# Patient Record
Sex: MALE | Race: OTHER | HISPANIC OR LATINO | Employment: UNEMPLOYED | URBAN - METROPOLITAN AREA
[De-identification: names, ages, dates, MRNs, and addresses within clinical notes are randomized per-mention and may not be internally consistent; named-entity substitution may affect disease eponyms.]

---

## 2023-07-07 ENCOUNTER — APPOINTMENT (EMERGENCY)
Dept: CT IMAGING | Facility: HOSPITAL | Age: 48
End: 2023-07-07

## 2023-07-07 ENCOUNTER — HOSPITAL ENCOUNTER (EMERGENCY)
Facility: HOSPITAL | Age: 48
Discharge: HOME | End: 2023-07-07
Attending: EMERGENCY MEDICINE

## 2023-07-07 VITALS
SYSTOLIC BLOOD PRESSURE: 130 MMHG | RESPIRATION RATE: 16 BRPM | TEMPERATURE: 98.8 F | OXYGEN SATURATION: 97 % | HEART RATE: 74 BPM | DIASTOLIC BLOOD PRESSURE: 81 MMHG

## 2023-07-07 DIAGNOSIS — Z76.0 MEDICATION REFILL: ICD-10-CM

## 2023-07-07 DIAGNOSIS — N20.1 RIGHT URETERAL STONE: Primary | ICD-10-CM

## 2023-07-07 LAB
ALBUMIN SERPL BCP-MCNC: 4.4 G/DL (ref 3.5–5)
ALP SERPL-CCNC: 76 U/L (ref 34–104)
ALT SERPL W P-5'-P-CCNC: 25 U/L (ref 7–52)
ANION GAP SERPL CALCULATED.3IONS-SCNC: 8 MMOL/L
AST SERPL W P-5'-P-CCNC: 17 U/L (ref 13–39)
BACTERIA UR QL AUTO: ABNORMAL /HPF
BASOPHILS # BLD AUTO: 0.02 THOUSANDS/ÂΜL (ref 0–0.1)
BASOPHILS NFR BLD AUTO: 0 % (ref 0–1)
BILIRUB SERPL-MCNC: 0.37 MG/DL (ref 0.2–1)
BILIRUB UR QL STRIP: NEGATIVE
BUN SERPL-MCNC: 18 MG/DL (ref 5–25)
CALCIUM SERPL-MCNC: 9.5 MG/DL (ref 8.4–10.2)
CHLORIDE SERPL-SCNC: 103 MMOL/L (ref 96–108)
CLARITY UR: CLEAR
CO2 SERPL-SCNC: 25 MMOL/L (ref 21–32)
COLOR UR: YELLOW
CREAT SERPL-MCNC: 1.17 MG/DL (ref 0.6–1.3)
EOSINOPHIL # BLD AUTO: 0.07 THOUSAND/ÂΜL (ref 0–0.61)
EOSINOPHIL NFR BLD AUTO: 1 % (ref 0–6)
ERYTHROCYTE [DISTWIDTH] IN BLOOD BY AUTOMATED COUNT: 13.2 % (ref 11.6–15.1)
GFR SERPL CREATININE-BSD FRML MDRD: 73 ML/MIN/1.73SQ M
GLUCOSE SERPL-MCNC: 128 MG/DL (ref 65–140)
GLUCOSE UR STRIP-MCNC: NEGATIVE MG/DL
HCT VFR BLD AUTO: 41.9 % (ref 36.5–49.3)
HGB BLD-MCNC: 14.3 G/DL (ref 12–17)
HGB UR QL STRIP.AUTO: ABNORMAL
IMM GRANULOCYTES # BLD AUTO: 0.03 THOUSAND/UL (ref 0–0.2)
IMM GRANULOCYTES NFR BLD AUTO: 0 % (ref 0–2)
KETONES UR STRIP-MCNC: NEGATIVE MG/DL
LEUKOCYTE ESTERASE UR QL STRIP: NEGATIVE
LIPASE SERPL-CCNC: 20 U/L (ref 11–82)
LYMPHOCYTES # BLD AUTO: 1.79 THOUSANDS/ÂΜL (ref 0.6–4.47)
LYMPHOCYTES NFR BLD AUTO: 24 % (ref 14–44)
MAGNESIUM SERPL-MCNC: 2 MG/DL (ref 1.9–2.7)
MCH RBC QN AUTO: 29.7 PG (ref 26.8–34.3)
MCHC RBC AUTO-ENTMCNC: 34.1 G/DL (ref 31.4–37.4)
MCV RBC AUTO: 87 FL (ref 82–98)
MONOCYTES # BLD AUTO: 0.49 THOUSAND/ÂΜL (ref 0.17–1.22)
MONOCYTES NFR BLD AUTO: 6 % (ref 4–12)
NEUTROPHILS # BLD AUTO: 5.2 THOUSANDS/ÂΜL (ref 1.85–7.62)
NEUTS SEG NFR BLD AUTO: 69 % (ref 43–75)
NITRITE UR QL STRIP: NEGATIVE
NON-SQ EPI CELLS URNS QL MICRO: ABNORMAL /HPF
NRBC BLD AUTO-RTO: 0 /100 WBCS
PH UR STRIP.AUTO: 7.5 [PH]
PLATELET # BLD AUTO: 248 THOUSANDS/UL (ref 149–390)
PMV BLD AUTO: 8.3 FL (ref 8.9–12.7)
POTASSIUM SERPL-SCNC: 3.8 MMOL/L (ref 3.5–5.3)
PROT SERPL-MCNC: 7.5 G/DL (ref 6.4–8.4)
PROT UR STRIP-MCNC: NEGATIVE MG/DL
RBC # BLD AUTO: 4.81 MILLION/UL (ref 3.88–5.62)
RBC #/AREA URNS AUTO: ABNORMAL /HPF
SODIUM SERPL-SCNC: 136 MMOL/L (ref 135–147)
SP GR UR STRIP.AUTO: 1.02 (ref 1–1.03)
UROBILINOGEN UR QL STRIP.AUTO: 0.2 E.U./DL
WBC # BLD AUTO: 7.6 THOUSAND/UL (ref 4.31–10.16)
WBC #/AREA URNS AUTO: ABNORMAL /HPF

## 2023-07-07 PROCEDURE — 36415 COLL VENOUS BLD VENIPUNCTURE: CPT | Performed by: EMERGENCY MEDICINE

## 2023-07-07 PROCEDURE — 85025 COMPLETE CBC W/AUTO DIFF WBC: CPT | Performed by: EMERGENCY MEDICINE

## 2023-07-07 PROCEDURE — 80053 COMPREHEN METABOLIC PANEL: CPT | Performed by: EMERGENCY MEDICINE

## 2023-07-07 PROCEDURE — 83735 ASSAY OF MAGNESIUM: CPT | Performed by: EMERGENCY MEDICINE

## 2023-07-07 PROCEDURE — 74176 CT ABD & PELVIS W/O CONTRAST: CPT

## 2023-07-07 PROCEDURE — 83690 ASSAY OF LIPASE: CPT | Performed by: EMERGENCY MEDICINE

## 2023-07-07 PROCEDURE — 81001 URINALYSIS AUTO W/SCOPE: CPT | Performed by: EMERGENCY MEDICINE

## 2023-07-07 RX ORDER — KETOROLAC TROMETHAMINE 30 MG/ML
15 INJECTION, SOLUTION INTRAMUSCULAR; INTRAVENOUS ONCE
Status: COMPLETED | OUTPATIENT
Start: 2023-07-07 | End: 2023-07-07

## 2023-07-07 RX ORDER — TAMSULOSIN HYDROCHLORIDE 0.4 MG/1
0.4 CAPSULE ORAL DAILY
Qty: 5 CAPSULE | Refills: 0 | Status: SHIPPED | OUTPATIENT
Start: 2023-07-08 | End: 2023-07-13

## 2023-07-07 RX ORDER — TAMSULOSIN HYDROCHLORIDE 0.4 MG/1
0.4 CAPSULE ORAL ONCE
Status: COMPLETED | OUTPATIENT
Start: 2023-07-07 | End: 2023-07-07

## 2023-07-07 RX ORDER — OXYCODONE HYDROCHLORIDE 5 MG/1
5 TABLET ORAL EVERY 6 HOURS PRN
Qty: 10 TABLET | Refills: 0 | Status: SHIPPED | OUTPATIENT
Start: 2023-07-07 | End: 2023-07-10

## 2023-07-07 RX ORDER — MONTELUKAST SODIUM 10 MG/1
10 TABLET ORAL
COMMUNITY

## 2023-07-07 RX ORDER — MONTELUKAST SODIUM 10 MG/1
10 TABLET ORAL EVERY EVENING
Qty: 14 TABLET | Refills: 0 | Status: SHIPPED | OUTPATIENT
Start: 2023-07-07 | End: 2023-07-21

## 2023-07-07 RX ORDER — ONDANSETRON 2 MG/ML
4 INJECTION INTRAMUSCULAR; INTRAVENOUS ONCE
Status: COMPLETED | OUTPATIENT
Start: 2023-07-07 | End: 2023-07-07

## 2023-07-07 RX ORDER — HYDROMORPHONE HCL/PF 1 MG/ML
1 SYRINGE (ML) INJECTION ONCE
Status: COMPLETED | OUTPATIENT
Start: 2023-07-07 | End: 2023-07-07

## 2023-07-07 RX ADMIN — ONDANSETRON 4 MG: 2 INJECTION INTRAMUSCULAR; INTRAVENOUS at 04:43

## 2023-07-07 RX ADMIN — HYDROMORPHONE HYDROCHLORIDE 1 MG: 1 INJECTION, SOLUTION INTRAMUSCULAR; INTRAVENOUS; SUBCUTANEOUS at 04:43

## 2023-07-07 RX ADMIN — TAMSULOSIN HYDROCHLORIDE 0.4 MG: 0.4 CAPSULE ORAL at 06:18

## 2023-07-07 RX ADMIN — SODIUM CHLORIDE 1000 ML: 0.9 INJECTION, SOLUTION INTRAVENOUS at 04:43

## 2023-07-07 RX ADMIN — KETOROLAC TROMETHAMINE 15 MG: 30 INJECTION, SOLUTION INTRAMUSCULAR at 04:43

## 2023-07-07 NOTE — ED PROVIDER NOTES
History  Chief Complaint   Patient presents with   • Back Pain     Pt reports right sided flank pain that radiates to right middle quadrant in abdomen, urgency to urinate. 1,000 mg tylenol around 1200 this morning. Patient is a 25-year-old male seen in the emergency department with concern for strong right-sided flank pain which began this evening at approximately 12 AM.  Patient notes radiation of pain to the right lower quadrant. Patient notes some associated nausea and vomiting. Patient notes no fever, chest pain, shortness of breath, or diarrhea. Patient notes history of cholecystectomy in the past.  Patient took multiple over-the-counter medications at home, without significant improvement of symptoms noted. Patient notes no other definite clear exacerbating or alleviating factors for his symptoms. Patient denies having similar pain in the past.          Prior to Admission Medications   Prescriptions Last Dose Informant Patient Reported? Taking?   montelukast (SINGULAIR) 10 mg tablet Past Month  Yes Yes   Sig: Take 10 mg by mouth daily at bedtime      Facility-Administered Medications: None       History reviewed. No pertinent past medical history. Past Surgical History:   Procedure Laterality Date   • CHOLECYSTECTOMY OPEN         History reviewed. No pertinent family history. I have reviewed and agree with the history as documented. E-Cigarette/Vaping   • E-Cigarette Use Never User      E-Cigarette/Vaping Substances     Social History     Tobacco Use   • Smoking status: Never   • Smokeless tobacco: Never   Vaping Use   • Vaping Use: Never used   Substance Use Topics   • Alcohol use: Yes     Comment: socially   • Drug use: Never       Review of Systems   Constitutional: Negative for chills and fever. HENT: Negative for ear pain and sore throat. Eyes: Negative for pain and visual disturbance. Respiratory: Negative for cough and shortness of breath.     Cardiovascular: Negative for chest pain and palpitations. Gastrointestinal: Positive for abdominal pain, nausea and vomiting. Genitourinary: Positive for flank pain. Negative for decreased urine volume. Musculoskeletal: Negative for gait problem and neck stiffness. Skin: Negative for color change and rash. Neurological: Negative for seizures and syncope. Psychiatric/Behavioral: Negative for agitation and confusion. All other systems reviewed and are negative. Physical Exam  Physical Exam  Vitals and nursing note reviewed. Constitutional:       Appearance: He is well-developed. Comments: appears uncomfortable   HENT:      Head: Normocephalic and atraumatic. Right Ear: External ear normal.      Left Ear: External ear normal.      Nose: Nose normal.      Mouth/Throat:      Pharynx: Oropharynx is clear. Eyes:      General: No scleral icterus. Conjunctiva/sclera: Conjunctivae normal.   Cardiovascular:      Rate and Rhythm: Normal rate and regular rhythm. Heart sounds: No murmur heard. Pulmonary:      Effort: Pulmonary effort is normal. No respiratory distress. Breath sounds: Normal breath sounds. Abdominal:      Palpations: Abdomen is soft. Tenderness: There is no abdominal tenderness. There is right CVA tenderness. There is no left CVA tenderness. Musculoskeletal:         General: No deformity or signs of injury. Cervical back: Normal range of motion and neck supple. Skin:     General: Skin is warm and dry. Neurological:      General: No focal deficit present. Mental Status: He is alert. Cranial Nerves: No cranial nerve deficit. Sensory: No sensory deficit. Psychiatric:         Mood and Affect: Mood normal.         Thought Content:  Thought content normal.         Vital Signs  ED Triage Vitals   Temperature Pulse Respirations Blood Pressure SpO2   07/07/23 0401 07/07/23 0401 07/07/23 0401 07/07/23 0401 07/07/23 0401   98.8 °F (37.1 °C) 75 18 (!) 160/103 96 %      Temp Source Heart Rate Source Patient Position - Orthostatic VS BP Location FiO2 (%)   07/07/23 0401 07/07/23 0401 07/07/23 0401 07/07/23 0401 --   Oral Monitor Sitting Left arm       Pain Score       07/07/23 0443       10 - Worst Possible Pain           Vitals:    07/07/23 0401 07/07/23 0521   BP: (!) 160/103 130/81   Pulse: 75 74   Patient Position - Orthostatic VS: Sitting Lying         Visual Acuity      ED Medications  Medications   tamsulosin (FLOMAX) capsule 0.4 mg (has no administration in time range)   HYDROmorphone (DILAUDID) injection 1 mg (1 mg Intravenous Given 7/7/23 0443)   ondansetron (ZOFRAN) injection 4 mg (4 mg Intravenous Given 7/7/23 0443)   ketorolac (TORADOL) injection 15 mg (15 mg Intravenous Given 7/7/23 0443)   sodium chloride 0.9 % bolus 1,000 mL (1,000 mL Intravenous New Bag 7/7/23 0443)       Diagnostic Studies  Results Reviewed     Procedure Component Value Units Date/Time    Urine Microscopic [[id number]]  (Abnormal) Collected: 07/07/23 0420    Lab Status: Final result Specimen: Urine, Clean Catch Updated: 07/07/23 0539     RBC, UA 4-10 /hpf      WBC, UA 0-5 /hpf      Epithelial Cells Occasional /hpf      Bacteria, UA Occasional /hpf     Comprehensive metabolic panel [[id number]] Collected: 07/07/23 0420    Lab Status: Final result Specimen: Blood from Arm, Right Updated: 07/07/23 0448     Sodium 136 mmol/L      Potassium 3.8 mmol/L      Chloride 103 mmol/L      CO2 25 mmol/L      ANION GAP 8 mmol/L      BUN 18 mg/dL      Creatinine 1.17 mg/dL      Glucose 128 mg/dL      Calcium 9.5 mg/dL      AST 17 U/L      ALT 25 U/L      Alkaline Phosphatase 76 U/L      Total Protein 7.5 g/dL      Albumin 4.4 g/dL      Total Bilirubin 0.37 mg/dL      eGFR 73 ml/min/1.73sq m     Narrative:      [place] guidelines for Chronic Kidney Disease (CKD):   •  Stage 1 with normal or high GFR (GFR > 90 mL/min/1.73 square meters)  •  Stage 2 Mild CKD (GFR = 60-89 mL/min/1.73 square meters)  •  Stage 3A Moderate CKD (GFR = 45-59 mL/min/1.73 square meters)  •  Stage 3B Moderate CKD (GFR = 30-44 mL/min/1.73 square meters)  •  Stage 4 Severe CKD (GFR = 15-29 mL/min/1.73 square meters)  •  Stage 5 End Stage CKD (GFR <15 mL/min/1.73 square meters)  Note: GFR calculation is accurate only with a steady state creatinine    Magnesium [[id number]]  (Normal) Collected: 07/07/23 0420    Lab Status: Final result Specimen: Blood from Arm, Right Updated: 07/07/23 0448     Magnesium 2.0 mg/dL     Lipase [[id number]]  (Normal) Collected: 07/07/23 0420    Lab Status: Final result Specimen: Blood from Arm, Right Updated: 07/07/23 0448     Lipase 20 u/L     CBC and differential [[id number]]  (Abnormal) Collected: 07/07/23 0420    Lab Status: Final result Specimen: Blood from Arm, Right Updated: 07/07/23 0428     WBC 7.60 Thousand/uL      RBC 4.81 Million/uL      Hemoglobin 14.3 g/dL      Hematocrit 41.9 %      MCV 87 fL      MCH 29.7 pg      MCHC 34.1 g/dL      RDW 13.2 %      MPV 8.3 fL      Platelets 561 Thousands/uL      nRBC 0 /100 WBCs      Neutrophils Relative 69 %      Immat GRANS % 0 %      Lymphocytes Relative 24 %      Monocytes Relative 6 %      Eosinophils Relative 1 %      Basophils Relative 0 %      Neutrophils Absolute 5.20 Thousands/µL      Immature Grans Absolute 0.03 Thousand/uL      Lymphocytes Absolute 1.79 Thousands/µL      Monocytes Absolute 0.49 Thousand/µL      Eosinophils Absolute 0.07 Thousand/µL      Basophils Absolute 0.02 Thousands/µL     UA w Reflex to Microscopic w Reflex to Culture [[id number]]  (Abnormal) Collected: 07/07/23 0420    Lab Status: Final result Specimen: Urine, Clean Catch Updated: 07/07/23 0428     Color, UA Yellow     Clarity, UA Clear     Specific Gravity, UA 1.020     pH, UA 7.5     Leukocytes, UA Negative     Nitrite, UA Negative     Protein, UA Negative mg/dl      Glucose, UA Negative mg/dl      Ketones, UA Negative mg/dl      Urobilinogen, UA 0.2 E.U./dl Bilirubin, UA Negative     Occult Blood, UA Trace-Intact                 CT renal stone study abdomen pelvis wo contrast   Final Result by Rene Mitchell MD (07/07 0528)      3 mm proximal right ureteral obstructing calculus located within from the upper one third with lower two third with mild to moderate right hydronephrosis and perinephric stranding      Additional nonobstructing bilateral renal calculi measuring about 2 to 3 mm            Workstation performed: [id number]                    Procedures  Procedures         ED Course  ED Course as of 07/07/23 0617   Fri Jul 07, 2023   0556 CT abdomen/pelvis-    IMPRESSION:     3 mm proximal right ureteral obstructing calculus located within from the upper one third with lower two third with mild to moderate right hydronephrosis and perinephric stranding     Additional nonobstructing bilateral renal calculi measuring about 2 to 3 mm                               SBIRT 20yo+    Flowsheet Row Most Recent Value   Initial Alcohol Screen: US AUDIT-C     1. How often do you have a drink containing alcohol? 1 Filed at: 07/07/2023 0423   2. How many drinks containing alcohol do you have on a typical day you are drinking? 0 Filed at: 07/07/2023 0423   3a. Male UNDER 65: How often do you have five or more drinks on one occasion? 0 Filed at: 07/07/2023 0423   3b. FEMALE Any Age, or MALE 65+: How often do you have 4 or more drinks on one occassion? 0 Filed at: 07/07/2023 0423   Audit-C Score 1 Filed at: 07/07/2023 0423   NAHEED: How many times in the past year have you. .. Used an illegal drug or used a prescription medication for non-medical reasons? Never Filed at: 07/07/2023 0423                    Medical Decision Making  Patient is a 76-year-old male seen in the emergency department with concern for right flank pain. Patient was treated with medication for symptom control, with good effect.   Laboratory evaluation remarkable for urinalysis positive for trace intact occult blood, 4-10 red blood cells, 0-5 white blood cells, occasional epithelial cells, occasional bacteria. CT abdomen/pelvis was obtained to evaluate for ureteral stone or other obstructive uropathy. CT imaging showed a 3 mm proximal right ureteral obstructing calculus with mild to moderate right hydronephrosis and perinephric stranding. Evaluation is not consistent with urinary tract infection. Plan to treat patient with course of Flomax in addition to medication as needed  for pain control, and have patient follow up with urology and outpatient providers. Patient stable for discharge home. Discharge instructions were reviewed with patient. Patient also requested and was provided refill prescription for his home montelukast medication. Right ureteral stone: acute illness or injury  Amount and/or Complexity of Data Reviewed  Labs: ordered. Decision-making details documented in ED Course. Radiology: ordered. Decision-making details documented in ED Course. Risk  Prescription drug management. Disposition  Final diagnoses:   Right ureteral stone   Medication refill     Time reflects when diagnosis was documented in both MDM as applicable and the Disposition within this note     Time User Action Codes Description Comment    7/7/2023  5:57 AM Thresa Glad Add [N20.1] Right ureteral stone     7/7/2023  6:14 AM Thresa Glad Add [Z76.0] Medication refill       ED Disposition     ED Disposition   Discharge    Condition   Stable    Date/Time   Fri Jul 7, 2023  5:57 AM    [address] discharge to home/self care.                Follow-up Information     Follow up With Specialties Details Why Contact Info Additional [address] [facility]) Urology Call today  [address] Sham[address] Girma [address] [place] [phone] [address] [facility]), [address] [place] [address], [address] [place]    Your primary doctor Call in 1 day       [address] [facility] [address] Call  As needed [address] [id number]  [address] [facility], [facility], [place], [place], [address]          Patient's Medications   Discharge Prescriptions    MONTELUKAST (SINGULAIR) 10 MG TABLET    Take 1 tablet (10 mg total) by mouth every evening for 14 days       Start Date: 7/7/2023  End Date: 7/21/2023       Order Dose: 10 mg       Quantity: 14 tablet    Refills: 0    OXYCODONE (ROXICODONE) 5 IMMEDIATE RELEASE TABLET    Take 1 tablet (5 mg total) by mouth every 6 (six) hours as needed for moderate pain or severe pain for up to 3 days Max Daily Amount: 20 mg       Start Date: 7/7/2023  End Date: 7/10/2023       Order Dose: 5 mg       Quantity: 10 tablet    Refills: 0    TAMSULOSIN (FLOMAX) 0.4 MG    Take 1 capsule (0.4 mg total) by mouth daily for 5 days Do not start before July 8, 2023.        Start Date: 7/8/2023  End Date: 7/13/2023       Order Dose: 0.4 mg       Quantity: 5 capsule    Refills: 0           PDMP Review       Value Time User    PDMP Reviewed  Yes 7/7/2023  6:02 AM Mikala Agosto MD          ED Provider  Electronically Signed by           Mikala Agosto MD  07/07/23 5395       Mikala Agosto MD  07/07/23 0683       Mikala Agosto MD  07/07/23 3850

## 2023-07-07 NOTE — DISCHARGE INSTRUCTIONS
Follow up with urology and with your primary doctor/outpatient providers, and return to the emergency department for new or worsening symptoms. CT ABDOMEN AND PELVIS WITHOUT IV CONTRAST - LOW DOSE RENAL STONE     INDICATION:   right flank pain, stone protocol. COMPARISON:  None. TECHNIQUE:  Low radiation dose thin section CT examination of the abdomen and pelvis was performed without intravenous or oral contrast according to a protocol specifically designed to evaluate for urinary tract calculus. Axial, sagittal, and coronal 2D   reformatted images were created from the source data and submitted for interpretation. Evaluation for pathology in the abdomen and pelvis that is unrelated to urinary tract calculi is limited. Radiation dose length product (DLP) for this visit:  475.7 mGy-cm . This examination, like all CT scans performed in the [facility], was performed utilizing techniques to minimize radiation dose exposure, including the use of iterative   reconstruction and automated exposure control. URINARY TRACT FINDINGS:     RIGHT KIDNEY AND URETER: There is mild to moderate right hydronephrosis. There is an obstructing proximal right ureteral calculus, measuring 3 mm, located at the level of superior aspect of the L4 vertebral, image 98 series 201. There is mild perinephric   stranding. This calculus is located in the proximal ureter at the junction at the upper one third with lower two third  Additional nonobstructing calculus measuring about 2 mm in the upper pole right kidney        LEFT KIDNEY AND URETER: Nonobstructing left renal calculi measuring more 2 to 3 mm     URINARY BLADDER:  Unremarkable.         ADDITIONAL FINDINGS:     LOWER CHEST:  No clinically significant abnormality identified in the visualized lower chest.     SOLID VISCERA: Limited low radiation dose noncontrast CT evaluation demonstrates no clinically significant abnormality of the imaged portions of the liver, spleen, pancreas, or adrenal glands. GALLBLADDER/BILIARY TREE: Gallbladder is surgically absent     STOMACH AND BOWEL:  Unremarkable. APPENDIX:  No findings to suggest appendicitis. ABDOMINOPELVIC CAVITY:  No ascites. No pneumoperitoneum. No lymphadenopathy. VESSELS: Unremarkable for patient's age. REPRODUCTIVE ORGANS:  Unremarkable for patient's age. ABDOMINAL WALL/INGUINAL REGIONS:  Unremarkable. OSSEOUS STRUCTURES:  No acute fracture or destructive osseous lesion.      IMPRESSION:     3 mm proximal right ureteral obstructing calculus located within from the upper one third with lower two third with mild to moderate right hydronephrosis and perinephric stranding     Additional nonobstructing bilateral renal calculi measuring about 2 to 3 mm

## 2023-10-30 ENCOUNTER — OFFICE VISIT (OUTPATIENT)
Dept: DENTISTRY | Facility: CLINIC | Age: 48
End: 2023-10-30

## 2023-10-30 VITALS — HEART RATE: 83 BPM | SYSTOLIC BLOOD PRESSURE: 128 MMHG | DIASTOLIC BLOOD PRESSURE: 84 MMHG

## 2023-10-30 DIAGNOSIS — Z01.20 ENCOUNTER FOR DENTAL EXAMINATION: Primary | ICD-10-CM

## 2023-10-30 PROCEDURE — D0150 COMPREHENSIVE ORAL EVALUATION - NEW OR ESTABLISHED PATIENT: HCPCS

## 2023-10-30 PROCEDURE — D0210 INTRAORAL - COMPLETE SERIES OF RADIOGRAPHIC IMAGES: HCPCS

## 2023-10-30 NOTE — PATIENT INSTRUCTIONS
Higiene oral   LO QUE NECESITA SABER:   El cuidado bucal previene infecciones, placa y sangrado de las encías, llagas al igual que las caries. También refresca el aliento y mejora el apetito. Realice la higiene bucal en las [place], después de cada comida y antes de acostarse en la noche. Es probable que necesite severiano higiene dental con más frecuencia si tiene porfirio oral deficiente. INSTRUCCIONES SOBRE EL [facility]:   Artículos que se usan para el cuidado oral:  Un cepillo de dientes eléctrico o manual    Pasta de dientes, palillos dentales e hilo dental    Un vaso con agua para enjuagarse    Un enjuague bucal    Un humectante labial a base de agua o severiano bálsamo labial.    [place] jamaal dientes:  Moje el cepillo de dientes y colóquele severiano pequeña cantidad de pasta dental.    Con cuidado coloque el cepillo en cada diente y muévalo en forma circular. No presione muy waldemar. [place] puede ocasionar lesiones en las encías. Limpie la superficie del diente en el interior, afuera y por encima. [place] jamaal encías y la parte superior de la [place]. Enjuague con agua martinez boca y escúpala. Repita rhys proceso con un enjuague bucal.    Seque alrededor de martinez boca. Aplique un humectante labial o severiano barrita de cacao para evitar que jamaal labios se resequen o se cuarteen. Usar cole dental en jamaal dientes: Ensarte el hilo entre cada diente, glen no lo empuje hacia las encías muy waldemar. [place] puede provocar daño a las encías. Asegúrese de usar el hilo en cada lado de los dientes. Es posible que necesite utilizar hilo dental encerado para movilizar el hilo con facilidad entre cada diente. La limpieza de las dentaduras: Retire las dentaduras de martinez boca antes de limpiarlas. Las dentaduras salen con más facilidad si están húmedas. [place] un sorbo de agua para quitarse las dentaduras. Con cuidado mueva las dentaduras de lado a lado para aflojarlas. Luego sáquelas.   [place] las dentaduras con agua damir y el limpiador o la pasta para dentaduras. [place] la totalidad de la superficie de las dentaduras con agua fría. No use [place]. No utilice [place]. Tenga cuidado de no doblar ningún broche de la dentadura mientras la cepilla. Enjuáguela. Pregunte a martinez médico cuál es el [employer] debe [employer]. Pregúntele a martinez médico cuál es el [employer] debe [employer]. Remoje las dentaduras en la solución indicada cada noche después de limpiarlas. Debe enjuagar las dentaduras con agua fría cuando se las Jomar Cower a colocar en martinez boca nuevamente. Programe severiano wesley de seguimiento cada 6 meses con martinez médico o [place] le indiquen: Anote jamaal preguntas para que se acuerde de hacerlas briseida jamaal visitas. Comuníquese con martinez médico o dentista si:  Usted desarrolla aftas o llagas en la boca. Las dentaduras no le quedan junito [place]. Al cepillarse siente dolor. Usted tiene preguntas o inquietudes acerca de martinez condición o cuidado. © Copyright [facility] 2023 Information is for End User's use only and may not be sold, redistributed or otherwise used for commercial purposes. Esta información es sólo para uso en educación. Martinez intención no es darle un consejo médico sobre enfermedades o tratamientos. Colsulte con martinez Camella Cradle farmacéutico antes de seguir cualquier régimen médico para saber si es seguro y efectivo para usted.

## 2023-10-30 NOTE — DENTAL PROCEDURE DETAILS
[facility] presents for a Comprehensive exam. Verbal consent for treatment given in addition to the forms. Reviewed health history - Patient is ASA I  Consents signed: Yes       Pain Scale: 0    Radiographs: Complete mouth series     Oral Hygiene instruction reviewed and given. Recommended Hygiene recall visits with the Senthil Roberts. Reason for visit:Comprehensive Exam  Rooming Includes:  Dental Vitals recorded. Allergies Reviewed  Medication Reviewed. Dental Health Compliance: Twice daily brushing, never flossing, use of fluoride toothpaste. Medical History Reviewed. ASA 1 - Normal health patient. Patient has no complaints/no pain. Patient presents for exam appointment. Treatment provided includes comprehensive exam performed by Dr. Disha Grullon and Full mouth series of xrays taken to evaluate overall health and decay. Intraoral exam/Oral Cancer Screening presents with no significant findings. Full mouth perio charting recorded. 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 [address] due October 2024. Dr. Sandro Kumari performed test on #31 with endo ice and tooth had no response. #31 was treatment planned for RCT/crown. Patient understands.    Next visit:gonsalo rct/crown, treatment planning appointment

## 2024-10-24 ENCOUNTER — APPOINTMENT (OUTPATIENT)
Dept: LAB | Facility: CLINIC | Age: 49
End: 2024-10-24

## 2024-10-24 ENCOUNTER — OFFICE VISIT (OUTPATIENT)
Dept: FAMILY MEDICINE CLINIC | Facility: CLINIC | Age: 49
End: 2024-10-24

## 2024-10-24 VITALS
RESPIRATION RATE: 18 BRPM | DIASTOLIC BLOOD PRESSURE: 76 MMHG | HEIGHT: 63 IN | TEMPERATURE: 98.1 F | SYSTOLIC BLOOD PRESSURE: 112 MMHG | BODY MASS INDEX: 42.12 KG/M2 | WEIGHT: 237.7 LBS | OXYGEN SATURATION: 97 % | HEART RATE: 73 BPM

## 2024-10-24 DIAGNOSIS — E66.01 CLASS 3 SEVERE OBESITY DUE TO EXCESS CALORIES WITHOUT SERIOUS COMORBIDITY WITH BODY MASS INDEX (BMI) OF 40.0 TO 44.9 IN ADULT (HCC): ICD-10-CM

## 2024-10-24 DIAGNOSIS — Z11.4 SCREENING FOR HIV (HUMAN IMMUNODEFICIENCY VIRUS): ICD-10-CM

## 2024-10-24 DIAGNOSIS — E66.813 CLASS 3 SEVERE OBESITY DUE TO EXCESS CALORIES WITHOUT SERIOUS COMORBIDITY WITH BODY MASS INDEX (BMI) OF 40.0 TO 44.9 IN ADULT (HCC): ICD-10-CM

## 2024-10-24 DIAGNOSIS — Z11.59 NEED FOR HEPATITIS C SCREENING TEST: ICD-10-CM

## 2024-10-24 DIAGNOSIS — F33.1 MODERATE RECURRENT MAJOR DEPRESSION (HCC): ICD-10-CM

## 2024-10-24 DIAGNOSIS — R42 DIZZINESS: ICD-10-CM

## 2024-10-24 DIAGNOSIS — R00.2 PALPITATIONS: ICD-10-CM

## 2024-10-24 DIAGNOSIS — Z59.9 FINANCIAL DIFFICULTIES: ICD-10-CM

## 2024-10-24 DIAGNOSIS — J30.9 ALLERGIC RHINITIS, UNSPECIFIED SEASONALITY, UNSPECIFIED TRIGGER: ICD-10-CM

## 2024-10-24 DIAGNOSIS — R42 DIZZINESS: Primary | ICD-10-CM

## 2024-10-24 DIAGNOSIS — Z59.41 FOOD INSECURITY: ICD-10-CM

## 2024-10-24 DIAGNOSIS — F41.9 ANXIETY: ICD-10-CM

## 2024-10-24 DIAGNOSIS — J45.998 OTHER ASTHMA: ICD-10-CM

## 2024-10-24 DIAGNOSIS — S06.9X1S TRAUMATIC BRAIN INJURY, WITH LOSS OF CONSCIOUSNESS OF 30 MINUTES OR LESS, SEQUELA (HCC): ICD-10-CM

## 2024-10-24 DIAGNOSIS — Z59.819 HOUSING INSTABILITY: ICD-10-CM

## 2024-10-24 PROBLEM — S06.9XAA TBI (TRAUMATIC BRAIN INJURY) (HCC): Status: ACTIVE | Noted: 2024-10-24

## 2024-10-24 LAB
ALBUMIN SERPL BCG-MCNC: 4.3 G/DL (ref 3.5–5)
ALP SERPL-CCNC: 66 U/L (ref 34–104)
ALT SERPL W P-5'-P-CCNC: 29 U/L (ref 7–52)
ANION GAP SERPL CALCULATED.3IONS-SCNC: 8 MMOL/L (ref 4–13)
AST SERPL W P-5'-P-CCNC: 17 U/L (ref 13–39)
BASOPHILS # BLD AUTO: 0.02 THOUSANDS/ΜL (ref 0–0.1)
BASOPHILS NFR BLD AUTO: 0 % (ref 0–1)
BILIRUB SERPL-MCNC: 0.39 MG/DL (ref 0.2–1)
BUN SERPL-MCNC: 12 MG/DL (ref 5–25)
CALCIUM SERPL-MCNC: 9.5 MG/DL (ref 8.4–10.2)
CHLORIDE SERPL-SCNC: 101 MMOL/L (ref 96–108)
CHOLEST SERPL-MCNC: 183 MG/DL
CO2 SERPL-SCNC: 30 MMOL/L (ref 21–32)
CREAT SERPL-MCNC: 0.84 MG/DL (ref 0.6–1.3)
EOSINOPHIL # BLD AUTO: 0.24 THOUSAND/ΜL (ref 0–0.61)
EOSINOPHIL NFR BLD AUTO: 4 % (ref 0–6)
ERYTHROCYTE [DISTWIDTH] IN BLOOD BY AUTOMATED COUNT: 13.1 % (ref 11.6–15.1)
EST. AVERAGE GLUCOSE BLD GHB EST-MCNC: 114 MG/DL
GFR SERPL CREATININE-BSD FRML MDRD: 103 ML/MIN/1.73SQ M
GLUCOSE P FAST SERPL-MCNC: 90 MG/DL (ref 65–99)
HBA1C MFR BLD: 5.6 %
HCT VFR BLD AUTO: 47.6 % (ref 36.5–49.3)
HCV AB SER QL: NORMAL
HDLC SERPL-MCNC: 42 MG/DL
HGB BLD-MCNC: 15.8 G/DL (ref 12–17)
IMM GRANULOCYTES # BLD AUTO: 0.02 THOUSAND/UL (ref 0–0.2)
IMM GRANULOCYTES NFR BLD AUTO: 0 % (ref 0–2)
LDLC SERPL CALC-MCNC: 114 MG/DL (ref 0–100)
LYMPHOCYTES # BLD AUTO: 2.25 THOUSANDS/ΜL (ref 0.6–4.47)
LYMPHOCYTES NFR BLD AUTO: 34 % (ref 14–44)
MCH RBC QN AUTO: 29.5 PG (ref 26.8–34.3)
MCHC RBC AUTO-ENTMCNC: 33.2 G/DL (ref 31.4–37.4)
MCV RBC AUTO: 89 FL (ref 82–98)
MONOCYTES # BLD AUTO: 0.41 THOUSAND/ΜL (ref 0.17–1.22)
MONOCYTES NFR BLD AUTO: 6 % (ref 4–12)
NEUTROPHILS # BLD AUTO: 3.75 THOUSANDS/ΜL (ref 1.85–7.62)
NEUTS SEG NFR BLD AUTO: 56 % (ref 43–75)
NONHDLC SERPL-MCNC: 141 MG/DL
NRBC BLD AUTO-RTO: 0 /100 WBCS
PLATELET # BLD AUTO: 314 THOUSANDS/UL (ref 149–390)
PMV BLD AUTO: 9.1 FL (ref 8.9–12.7)
POTASSIUM SERPL-SCNC: 3.8 MMOL/L (ref 3.5–5.3)
PROT SERPL-MCNC: 7.6 G/DL (ref 6.4–8.4)
RBC # BLD AUTO: 5.35 MILLION/UL (ref 3.88–5.62)
SODIUM SERPL-SCNC: 139 MMOL/L (ref 135–147)
TRIGL SERPL-MCNC: 136 MG/DL
TSH SERPL DL<=0.05 MIU/L-ACNC: 2.04 UIU/ML (ref 0.45–4.5)
WBC # BLD AUTO: 6.69 THOUSAND/UL (ref 4.31–10.16)

## 2024-10-24 PROCEDURE — 85025 COMPLETE CBC W/AUTO DIFF WBC: CPT

## 2024-10-24 PROCEDURE — 87389 HIV-1 AG W/HIV-1&-2 AB AG IA: CPT

## 2024-10-24 PROCEDURE — 99205 OFFICE O/P NEW HI 60 MIN: CPT | Performed by: FAMILY MEDICINE

## 2024-10-24 PROCEDURE — 80053 COMPREHEN METABOLIC PANEL: CPT

## 2024-10-24 PROCEDURE — 84443 ASSAY THYROID STIM HORMONE: CPT

## 2024-10-24 PROCEDURE — 83036 HEMOGLOBIN GLYCOSYLATED A1C: CPT

## 2024-10-24 PROCEDURE — 86803 HEPATITIS C AB TEST: CPT

## 2024-10-24 PROCEDURE — 80061 LIPID PANEL: CPT

## 2024-10-24 PROCEDURE — 36415 COLL VENOUS BLD VENIPUNCTURE: CPT

## 2024-10-24 RX ORDER — MECLIZINE HYDROCHLORIDE 25 MG/1
25 TABLET ORAL EVERY 12 HOURS PRN
Qty: 30 TABLET | Refills: 1 | Status: SHIPPED | OUTPATIENT
Start: 2024-10-24

## 2024-10-24 RX ORDER — MONTELUKAST SODIUM 10 MG/1
10 TABLET ORAL
Qty: 30 TABLET | Refills: 3 | Status: SHIPPED | OUTPATIENT
Start: 2024-10-24

## 2024-10-24 SDOH — ECONOMIC STABILITY - FOOD INSECURITY: FOOD INSECURITY: Z59.41

## 2024-10-24 SDOH — ECONOMIC STABILITY - INCOME SECURITY: PROBLEM RELATED TO HOUSING AND ECONOMIC CIRCUMSTANCES, UNSPECIFIED: Z59.9

## 2024-10-24 SDOH — ECONOMIC STABILITY - HOUSING INSECURITY: HOUSING INSTABILITY UNSPECIFIED: Z59.819

## 2024-10-24 NOTE — ASSESSMENT & PLAN NOTE
Orders:    Hemoglobin A1C; Future    TSH, 3rd generation with Free T4 reflex; Future    Lipid panel; Future

## 2024-10-24 NOTE — PATIENT INSTRUCTIONS
"Patient Education     Depresión en adultos   Conceptos Básicos   Redactado por los médicos y editores de UpToDate   ¿Qué es la depresión? -- La depresión es un trastorno que hace que se sienta elias, glen no es severiano tristeza normal. La depresión puede hacer que le resulte difícil trabajar, estudiar o hacer jamaal tareas cotidianas.  ¿Cuál es la causa de la depresión? -- La depresión se debe a problemas en unas sustancias químicas del cerebro llamadas \"neurotransmisores\". Algunas personas podrían tener severiano mayor posibilidad de tener depresión si hay antecedentes del padecimiento en la jose. Además, hay otros factores que podrían influir, dalton las hormonas, ciertos problemas de porfirio, las medicinas, el estrés, vanessa recibido maltratos en la infancia, problemas familiares y problemas con amigos, en la escuela o el trabajo.  ¿Cómo sé si estoy deprimido? -- Las personas deprimidas se sienten tristes la mayor parte del tiempo, dalton mínimo briseida 2 semanas. También tienen al menos 1 de estos 2 síntomas:   Ya no disfrutan ni les interesan cosas que antes les gustaban.   Están tristes, afligidos, desesperanzados o malhumorados la mayor parte del día, abdirashid todos los días.  Las personas que tienen depresión también pueden presentar otros síntomas. Algunos ejemplos son:   Cambios en el apetito o el peso. Pueden comer muy poco o demasiado, o subir o bajar de peso involuntariamente.   Dormir demasiado o muy poco   Sentirse cansadas o sin energía   Sentirse culpables, indefensas o sentir que no marii nada   Tener dificultades con la concentración o la memoria   Estar inquietas o tener dificultad para estar quietas, o moverse o hablar más despacio de lo normal   Tener pensamientos recurrentes de muerte o suicidio  Si piensa que podría estar deprimido, consulte a martinez médico o enfermero. Únicamente alguien con capacitación en porfirio mental puede determinar con seguridad si usted está deprimido.  ¿Cómo se diagnostica la " "depresión? -- Martinez médico o enfermero le hará un examen físico, le hará preguntas y podría solicitar pruebas. La depresión puede tener un gran impacto en martinez primitivo. Afortunadamente, la depresión se puede tratar, y cuanto antes se inicie el tratamiento, mejor funcionará.  ¡Pida ayuda de inmediato si está pensando en lastimarse o suicidarse! -- Si alguna vez siente que podría hacerse daño o dañar a otra persona, puede obtener ayuda:   En [facility], comuníquese con la Línea de Prevención del Suicidio y Crisis 988:   Para hablar con alguien, envíe un mensaje de texto o llame al 988.   Para hablar con alguien en línea, visite [email or web address].   Llame a martinez médico o enfermero, y dígale que es urgente.   Pida severiano ambulancia (en [facility] y [place], llame al 9-1-1).   Vaya al departamento de emergencias del hospital más cercano.  ¿Cuáles son los tratamientos para la depresión? -- Martinez médico o enfermero colaborará con usted para crear un plan de tratamiento. El tratamiento puede abarcar lo siguiente:   Ayudarlo a aprender más sobre la depresión   Terapia (con un psiquiatra, psicólogo, enfermero o trabajador social)   Medicinas que alivian la depresión   Crear un plan para limitar el acceso a los objetos que podría usar para hacerse daño   Otros tratamientos que transmiten ondas magnéticas o electricidad al cerebro  Además del tratamiento, realizar actividad física con regularidad también puede ayudarle a sentirse mejor.  Las personas que tienen severiano depresión no muy grave pueden mejorar si usan medicinas o hablan con un consejero. Las personas que tienen depresión grave generalmente necesitan medicinas para sentirse mejor y es posible que también deban consultar a un consejero.  Otro tratamiento consiste en colocar un dispositivo en contacto con el cuero cabelludo para transmitir ondas magnéticas al cerebro. [place] se llama \"estimulación magnética transcraneal\" (\"EMT\"). Los médicos podrían sugerir que reciba estimulación " "magnética transcraneal si las medicinas y la terapia no malave sido de ayuda.  Algunas personas con depresión grave podrían necesitar un tratamiento llamado \"terapia electroconvulsiva\" (\"SUKHDEEP\"). En la terapia electroconvulsiva, los médicos transmiten severiano corriente eléctrica al cerebro del paciente de manera suarez.  ¿Cuándo me sentiré mejor? -- La mayoría de las opciones de tratamiento tardan un poco en hacer efecto.   Muchas personas que usan medicinas empiezan a sentirse mejor en 2 semanas, glen pueden pasar entre 4 y 8 semanas hasta que la medicina eddie pleno efecto.   Muchas personas que consultan a un consejero empiezan a sentirse mejor a las pocas semanas, glen pueden pasar entre 8 y 10 semanas hasta que se vean mayores beneficios.  Si el primer tratamiento que prueba no le da resultado, avísele a martinez médico o enfermero, glen no se rinda. Algunas personas necesitan probar distintos tratamientos o combinaciones de tratamientos antes de hallar un método que funcione. Martinez médico, enfermero o consejero puede hablar con usted para hallar el tratamiento indicado. También puede ayudarlo a lidiar con el problema mientras busca el tratamiento adecuado o espera a que rhys eddie efecto.  ¿Cómo decido qué tratamiento seguir? -- Usted y martinez médico o enfermero deben trabajar juntos para elegir martinez tratamiento. Las medicinas podrían hacer efecto un poco más rápido que la terapia psicológica, glen también pueden causar efectos secundarios. Además, a algunas personas no les gusta la idea de moshe medicinas.  Consultar a un consejero implica hablar de jamaal sentimientos. [place] también es difícil para algunas personas.  ¿Qué ocurre si best medicinas para la depresión y quiero tener un bebé? -- Algunas medicinas para la depresión pueden causar problemas en el feto, glen severiano depresión no tratada también puede causar problemas briseida el embarazo. Si quiere quedar embarazada, hable con martinez médico, glen no deje de moshe jamaal medicinas. Juntos " "pueden planificar la manera más suarez de tener un bebé.  También es importante que hable con martinez médico si desea amamantar a martinez bebé. Amamantar tiene muchos beneficios para la madre y para el bebé. Algunas medicinas para la depresión son más seguras de usar que otras mientras se amamanta, glen tener severiano depresión sin tratar después de eugenia a aliya también puede causar problemas, así que no deje de moshe jamaal medicinas. El médico puede trabajar con usted para planificar la forma más suarez de alimentar a martinez bebé.  Todos los artículos se actualizan a medida que se descubre nueva evidencia y culmina nuestro proceso de evaluación por homólogos   Rox artículo se recuperó de UpToDate el: Mar 06, 2024.  Artículo 39525 Versión 22.0.es-419.1  Release: 32.2.4 - C32.64  © 2024 UpToDate, Inc. Todos los derechos reservados.  figura 1: Trastornos del estado de ánimo causados por problemas en el cerebro     Los trastornos del estado de ánimo, dalton la depresión y el trastorno bipolar, se deben a problemas en los \"neurotransmisores\", unas sustancias químicas del cerebro que pueden afectar las emociones. Parece que los tratamientos para los trastornos del estado de ánimo modifican los niveles de ciertos neurotransmisores.  Gráfico 61609 Versión 4.0  Exención de responsabilidad y uso de la información del consumidor   Descargo de responsabilidad: esta información generalizada es un resumen limitado de información sobre el diagnóstico, el tratamiento y/o los medicamentos. No pretende ser exhaustiva y se debe utilizar dalton herramienta para ayudar al usuario a comprender y/o evaluar las posibles opciones de diagnóstico y tratamiento. No incluye toda la información sobre afecciones, tratamientos, medicamentos, efectos secundarios o riesgos puedan ser aplicables a un paciente específico. No tiene el propósito de servir dalton recomendación médica ni de sustituir la recomendación médica, el diagnóstico o el tratamiento de un profesional de " atención médica que se base en el examen y la evaluación de rhys profesional de la porfirio respecto a las circunstancias específicas y únicas del paciente. Los pacientes deben hablar con un profesional de atención médica para obtener información completa sobre martinez porfirio, cuestiones médicas y opciones de tratamiento, incluidos los riesgos o los beneficios relacionados con el uso de medicamentos. Esta información no certifica que los tratamientos o medicamentos ghulam seguros, eficaces o estén aprobados para tratar a un paciente específico. "[employer]te, Inc. y jamaal afiliados renuncian a cualquier garantía o responsabilidad relacionada con esta información o el uso de la misma.El uso de esta información está sujeto a las Condiciones de uso, disponibles en https://www.[employer].com/en/know/clinical-effectiveness-terms. 2024© "[employer]te, Inc. y jamaal afiliados y/o licenciantes. Todos los derechos reservados.  Copyright   © 2024 "[employer]te, Inc. Todos los derechos reservados.

## 2024-10-24 NOTE — ASSESSMENT & PLAN NOTE
Depression Screening Follow-up Plan: Patient's depression screening was positive with a PHQ-2 score of 3. Their PHQ-9 score was 11. Patient assessed for underlying major depression. They have no active suicidal ideations. Brief counseling provided and recommend additional follow-up/re-evaluation next office visit. Patient advised to follow-up with PCP for further management.    Orders:    sertraline (ZOLOFT) 50 mg tablet; Take 1 tablet (50 mg total) by mouth daily

## 2024-10-24 NOTE — ASSESSMENT & PLAN NOTE
Avoid allergic triggers    Orders:    montelukast (SINGULAIR) 10 mg tablet; Take 1 tablet (10 mg total) by mouth daily at bedtime

## 2024-10-24 NOTE — ASSESSMENT & PLAN NOTE
Intermittent dizziness  Will check basic labs  Increase fluid and electrolyte hydration  Meclizine as needed  Orders:    CBC and differential; Future    Comprehensive metabolic panel; Future    meclizine (ANTIVERT) 25 mg tablet; Take 1 tablet (25 mg total) by mouth every 12 (twelve) hours as needed for dizziness
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
## 2024-10-24 NOTE — ASSESSMENT & PLAN NOTE
Stable  Continue Singulair  Orders:    montelukast (SINGULAIR) 10 mg tablet; Take 1 tablet (10 mg total) by mouth daily at bedtime

## 2024-10-24 NOTE — ASSESSMENT & PLAN NOTE
Chronic intermittent palpitation  Likely from anxiety  Will check basic labs  We discussed EKG and further cardiac testing.  Unfortunately, he is uninsured at the moment so he declines.      Orders:    TSH, 3rd generation with Free T4 reflex; Future

## 2024-10-25 LAB
HIV 1+2 AB+HIV1 P24 AG SERPL QL IA: NORMAL
HIV 2 AB SERPL QL IA: NORMAL
HIV1 AB SERPL QL IA: NORMAL
HIV1 P24 AG SERPL QL IA: NORMAL

## 2024-11-06 ENCOUNTER — PATIENT OUTREACH (OUTPATIENT)
Dept: FAMILY MEDICINE CLINIC | Facility: CLINIC | Age: 49
End: 2024-11-06
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
## 2024-12-05 ENCOUNTER — OFFICE VISIT (OUTPATIENT)
Dept: FAMILY MEDICINE CLINIC | Facility: CLINIC | Age: 49
End: 2024-12-05

## 2024-12-05 VITALS
SYSTOLIC BLOOD PRESSURE: 110 MMHG | BODY MASS INDEX: 42.59 KG/M2 | RESPIRATION RATE: 16 BRPM | WEIGHT: 240.4 LBS | OXYGEN SATURATION: 95 % | DIASTOLIC BLOOD PRESSURE: 84 MMHG | HEART RATE: 63 BPM | HEIGHT: 63 IN | TEMPERATURE: 97.5 F

## 2024-12-05 DIAGNOSIS — Z23 ENCOUNTER FOR IMMUNIZATION: ICD-10-CM

## 2024-12-05 DIAGNOSIS — E66.01 CLASS 3 SEVERE OBESITY DUE TO EXCESS CALORIES WITHOUT SERIOUS COMORBIDITY WITH BODY MASS INDEX (BMI) OF 40.0 TO 44.9 IN ADULT (HCC): ICD-10-CM

## 2024-12-05 DIAGNOSIS — F33.1 MODERATE RECURRENT MAJOR DEPRESSION (HCC): ICD-10-CM

## 2024-12-05 DIAGNOSIS — L30.9 ECZEMA, UNSPECIFIED TYPE: Primary | ICD-10-CM

## 2024-12-05 DIAGNOSIS — R42 DIZZINESS: ICD-10-CM

## 2024-12-05 DIAGNOSIS — E66.813 CLASS 3 SEVERE OBESITY DUE TO EXCESS CALORIES WITHOUT SERIOUS COMORBIDITY WITH BODY MASS INDEX (BMI) OF 40.0 TO 44.9 IN ADULT (HCC): ICD-10-CM

## 2024-12-05 PROCEDURE — 99214 OFFICE O/P EST MOD 30 MIN: CPT | Performed by: FAMILY MEDICINE

## 2024-12-05 PROCEDURE — 90656 IIV3 VACC NO PRSV 0.5 ML IM: CPT | Performed by: FAMILY MEDICINE

## 2024-12-05 PROCEDURE — 90471 IMMUNIZATION ADMIN: CPT | Performed by: FAMILY MEDICINE

## 2024-12-05 RX ORDER — TRIAMCINOLONE ACETONIDE 1 MG/G
OINTMENT TOPICAL 2 TIMES DAILY
Qty: 30 G | Refills: 1 | Status: SHIPPED | OUTPATIENT
Start: 2024-12-05 | End: 2024-12-15

## 2024-12-05 NOTE — ASSESSMENT & PLAN NOTE
Intermittent symptoms  Reviewed his recent labs which are unremarkable  Continue to encourage increasing fluid intake  He can try meclizine as needed

## 2024-12-05 NOTE — ASSESSMENT & PLAN NOTE
Currently stable  Denies suicidal or homicidal ideation  He was prescribed Zoloft which he took for a couple of days and then self discontinued  He feels much better  He has resources for outpatient mental health therapy

## 2024-12-05 NOTE — PROGRESS NOTES
Name: Geo Townsend      : 1975      MRN: [id number]  Encounter Provider: Brian Lake MD  Encounter Date: 2024   Encounter department: [facility]  :  Assessment & Plan  Eczema, unspecified type  Pruritic dry scaly rash involving Bilateteral legs and abdomen  Consistent with eczema  Recommend topical emollient daily  Short course of topical steroid as needed for flareups  Orders:    triamcinolone (KENALOG) 0.1 % ointment; Apply topically 2 (two) times a day for 10 days    Moderate recurrent major depression (HCC)  Currently stable  Denies suicidal or homicidal ideation  He was prescribed Zoloft which he took for a couple of days and then self discontinued  He feels much better  He has resources for outpatient mental health therapy         Encounter for immunization    Orders:    influenza vaccine preservative-free 0.5 mL IM (Fluzone, Afluria, Fluarix, Flulaval)    Dizziness  Intermittent symptoms  Reviewed his recent labs which are unremarkable  Continue to encourage increasing fluid intake  He can try meclizine as needed         Class 3 severe obesity due to excess calories without serious comorbidity with body mass index (BMI) of 40.0 to 44.9 in adult ([facility])  Dietary and exercise counseling provided to patient                History of Present Illness     [place] [employer] interpretive services used for the visit ([id number])  49-year-old male with a history of obesity and depression who presents today for follow-up.  He is doing much better.  He was recently prescribed Zoloft for depression.  He took it for a couple of days and he self discontinued the medication.  He states that he feels that his mood has improved.  He was very worried about his lab results.  He also reports pruritic rash involving his abdominal wall and bilateral anterior legs.  This is a chronic rash that tends to flareup intermittently particularly during the colder seasons.  He has been using  "topical Vaseline at home without improvement.  He reports that he was previously prescribed topical steroids in the past which seems to help      Review of Systems   Constitutional:  Negative for chills, diaphoresis, fatigue and fever.   Respiratory:  Negative for shortness of breath and wheezing.    Cardiovascular:  Negative for chest pain.   Gastrointestinal:  Negative for abdominal pain, nausea and vomiting.   Musculoskeletal:  Negative for back pain.   Skin:  Positive for rash.   Neurological:  Negative for dizziness, seizures, syncope and light-headedness.   Psychiatric/Behavioral:  Negative for confusion, dysphoric mood, sleep disturbance and suicidal ideas.           Objective   /84 (BP Location: Right arm, Patient Position: Sitting, Cuff Size: Large)   Pulse 63   Temp 97.5 °F (36.4 °C) (Temporal)   Resp 16   Ht 5' 2.5\" (1.588 m)   Wt 109 kg (240 lb 6.4 oz)   SpO2 95%   BMI 43.27 kg/m²      Physical Exam  Vitals and nursing note reviewed.   Constitutional:       General: He is not in acute distress.     Appearance: Normal appearance. He is well-developed. He is obese. He is not ill-appearing, toxic-appearing or diaphoretic.   HENT:      Head: Normocephalic and atraumatic.      Right Ear: External ear normal.      Left Ear: External ear normal.   Eyes:      Conjunctiva/sclera: Conjunctivae normal.   Cardiovascular:      Rate and Rhythm: Normal rate and regular rhythm.      Heart sounds: Normal heart sounds. No murmur heard.     No friction rub. No gallop.   Pulmonary:      Effort: Pulmonary effort is normal. No respiratory distress.      Breath sounds: Normal breath sounds.   Abdominal:      Palpations: Abdomen is soft.      Tenderness: There is no abdominal tenderness.   Musculoskeletal:      Cervical back: Normal range of motion.      Right lower leg: No edema.      Left lower leg: No edema.   Skin:     General: Skin is warm.      Capillary Refill: Capillary refill takes less than 2 seconds. "             Comments: Erythematous, dry scaly rash involving abdominal wall and bilateral lower legs   Neurological:      Mental Status: He is alert.   Psychiatric:         Mood and Affect: Mood normal.

## 2025-01-28 ENCOUNTER — OFFICE VISIT (OUTPATIENT)
Dept: DENTISTRY | Facility: CLINIC | Age: 50
End: 2025-01-28

## 2025-01-28 VITALS — TEMPERATURE: 98.4 F | HEART RATE: 80 BPM | SYSTOLIC BLOOD PRESSURE: 127 MMHG | DIASTOLIC BLOOD PRESSURE: 83 MMHG

## 2025-01-28 DIAGNOSIS — Z01.20 ENCOUNTER FOR DENTAL EXAMINATION: Primary | ICD-10-CM

## 2025-01-28 PROCEDURE — D1330 ORAL HYGIENE INSTRUCTIONS: HCPCS | Performed by: DENTAL HYGIENIST

## 2025-01-28 PROCEDURE — D0274 BITEWINGS - 4 RADIOGRAPHIC IMAGES: HCPCS | Performed by: DENTAL HYGIENIST

## 2025-01-28 PROCEDURE — D0120 PERIODIC ORAL EVALUATION - ESTABLISHED PATIENT: HCPCS | Performed by: DENTIST

## 2025-01-28 PROCEDURE — D1110 PROPHYLAXIS - ADULT: HCPCS | Performed by: DENTAL HYGIENIST

## 2025-01-28 PROCEDURE — D0220 INTRAORAL - PERIAPICAL FIRST RADIOGRAPHIC IMAGE: HCPCS | Performed by: DENTAL HYGIENIST

## 2025-01-28 NOTE — PROGRESS NOTES
PERIODIC EXAM, ADULT PROPHY , 4 BWX   REVIEWED MED HX: meds, allergies, health changes reviewed in [facility]. All consents signed.  CHIEF CONCERN:  sensitivity to sweets occasionally - sensitivity to cold  PAIN SCALE:  0  ASA CLASS:  II  PLAQUE:  mild  CALCULUS:  light  BLEEDING:   light  STAIN :   light      PERIO:  Mild localized gingivitis  ---FMP - 1-4 mm w/ lt BUP  ---Stage 1, grade A    Hygiene Procedures:  Scaled, Polished, Flossed and Used Cavitron    Oral Hygiene Instruction: Brushing Minimum 2x daily for 2 minutes, daily flossing, Listerine, and Recommended soft toothbrush only    Dispensed: Toothbrush, Toothpaste, Floss    Visual and Tactile Intraoral/ Extraoral evaluation: Oral and Oropharyngeal cancer evaluation. No findings     Dr. FIORELLA Sequeira   Reviewed with patient clinical and radiographic findings and patient verbalized understanding. All questions and concerns addressed.     REFERRALS: none    CARIES FINDINGS: see tooth chart  ---crowns needed       TREATMENT  PLAN :   NV1:  Rest 31 - DO - 60 min w/ Mahoney - please quote price  NV2: 6mrc - 50 min    Last BWX:  1/28/25  Last [facility]    FMX :  10/30/23

## 2025-02-05 ENCOUNTER — OFFICE VISIT (OUTPATIENT)
Dept: DENTISTRY | Facility: CLINIC | Age: 50
End: 2025-02-05

## 2025-02-05 VITALS — DIASTOLIC BLOOD PRESSURE: 87 MMHG | SYSTOLIC BLOOD PRESSURE: 130 MMHG | HEART RATE: 89 BPM

## 2025-02-05 DIAGNOSIS — K02.9 DENTAL CARIES: Primary | ICD-10-CM

## 2025-02-05 PROCEDURE — D2392 RESIN-BASED COMPOSITE - 2 SURFACES, POSTERIOR: HCPCS

## 2025-02-05 NOTE — DENTAL PROCEDURE DETAILS
Composite Filling    Geo Townsend presents for composite filling. PMH reviewed, no changes.    Discussed with patient need for RCT if pulp exposure occurs or in future if pulp is inflamed. Pt understands and consents.    Applied topical benzocaine, administered 1 carps 2% lido 1:100k epi via IANB    Prepped tooth #31 DO with 835 Saranya on high speed. Caries removed with round carbide on slow speed. Placed limelite on deepest part of prep. Placed perez matrix. Isolation with cotton rolls and dri-angles    Etch with 37% H2PO4, rinse, dry. Applied Adhese with 20 second scrub once, gentle air dry and light cured for 10s. Restored with Tetric bulk osiel shade A2 and light cured.    Refined with finishing burs, polished with enhance point. Verified occlusion and contacts. Pt left satisfied.    NV: #2 O

## 2025-02-05 NOTE — PROGRESS NOTES
Procedure Details  31 DO  - RESIN-BASED COMPOSITE - 2 SURFACES, POSTERIOR  Composite Filling    Geo Townsend presents for composite filling. PMH reviewed, no changes.    Discussed with patient need for RCT if pulp exposure occurs or in future if pulp is inflamed. Pt understands and consents.    Applied topical benzocaine, administered 1 carps 2% lido 1:100k epi via IANB    Prepped tooth #31 DO with 835 Saranya on high speed. Caries removed with round carbide on slow speed. Placed limelite on deepest part of prep. Placed perez matrix. Isolation with cotton rolls and dri-angles    Etch with 37% H2PO4, rinse, dry. Applied Adhese with 20 second scrub once, gentle air dry and light cured for 10s. Restored with Tetric bulk osiel shade A2 and light cured.    Refined with finishing burs, polished with enhance point. Verified occlusion and contacts. Pt left satisfied.    NV: #2 O

## 2025-02-06 ENCOUNTER — OFFICE VISIT (OUTPATIENT)
Dept: DENTISTRY | Facility: CLINIC | Age: 50
End: 2025-02-06

## 2025-02-06 VITALS — HEART RATE: 87 BPM | SYSTOLIC BLOOD PRESSURE: 137 MMHG | DIASTOLIC BLOOD PRESSURE: 85 MMHG

## 2025-02-06 DIAGNOSIS — K02.9 DENTAL CARIES: Primary | ICD-10-CM

## 2025-02-06 PROCEDURE — D2950 CORE BUILDUP, INCLUDING ANY PINS WHEN REQUIRED: HCPCS

## 2025-02-17 NOTE — DENTAL PROCEDURE DETAILS
Core Buildup #3    Geo Townsend 49 y.o. male presents for Core Buildup #3.  PMH reviewed, no changes, ASA ASA 2 - Patient with mild systemic disease with no functional limitations.     Diagnosis:  Core indicated for restoration of lost tooth structure, needed prior to crown prep.    Consent:  Tx plan reviewed for core buildup #3.  Patient understands and consent given by Self via verbal consent.    Anesthesia:  Topical 20% benzocaine and 1 carps 4% Septocaine 1:100k epi via buccal infiltration    Procedure details:    Isolation: Cotton rolls and Dry angles.    Removed caries or any other existing restorative material, refined prep as needed.  Band placement: [place] matrix.  Etch 37% H2PO4 20 seconds. Rinsed and suctioned.  Applied bonding/adhesive agent with 20 second scrub once, gentle air dry and light cured for 10s.  Restored with Multicore core buildup material and lime lite liner (N/A) and light cured.     Checked and adjusted occlusion.    Patient left ambulatory and alert.    NV: #11 DL, 12 MO

## 2025-02-17 NOTE — PROGRESS NOTES
COVID orders placed per protocol.    Onesimo Gorman, ATC   Procedure Details  3  - CORE BUILDUP, INCLUDING ANY PINS WHEN REQUIRED  Core Buildup #3    Geo Townsend 49 y.o. male presents for Core Buildup #3.  PMH reviewed, no changes, ASA ASA 2 - Patient with mild systemic disease with no functional limitations.     Diagnosis:  Core indicated for restoration of lost tooth structure, needed prior to crown prep.    Consent:  Tx plan reviewed for core buildup #3.  Patient understands and consent given by Self via verbal consent.    Anesthesia:  Topical 20% benzocaine and 1 carps 4% Septocaine 1:100k epi via buccal infiltration    Procedure details:    Isolation: Cotton rolls and Dry angles.    Removed caries or any other existing restorative material, refined prep as needed.  Band placement: Tofflemire matrix.  Etch 37% H2PO4 20 seconds. Rinsed and suctioned.  Applied bonding/adhesive agent with 20 second scrub once, gentle air dry and light cured for 10s.  Restored with Multicore core buildup material and lime lite liner  (N/A) and light cured.     Checked and adjusted occlusion.    Patient left ambulatory and alert.    NV: #11 DL, 12 MO

## 2025-02-18 ENCOUNTER — OFFICE VISIT (OUTPATIENT)
Dept: DENTISTRY | Facility: CLINIC | Age: 50
End: 2025-02-18

## 2025-02-18 VITALS — HEART RATE: 88 BPM | TEMPERATURE: 98.4 F | SYSTOLIC BLOOD PRESSURE: 125 MMHG | DIASTOLIC BLOOD PRESSURE: 80 MMHG

## 2025-02-18 DIAGNOSIS — K02.9 DENTAL CARIES: Primary | ICD-10-CM

## 2025-02-18 PROCEDURE — D2331 RESIN-BASED COMPOSITE - 2 SURFACES, ANTERIOR: HCPCS

## 2025-02-18 PROCEDURE — D2392 RESIN-BASED COMPOSITE - 2 SURFACES, POSTERIOR: HCPCS

## 2025-02-18 NOTE — DENTAL PROCEDURE DETAILS
Composite Filling    Geo Kristian presents for composite filling. PMH reviewed, no changes.    Applied topical benzocaine, administered 1 carp 4% articaine 1:100k epi via buccal infiltration    Prepped tooth #11 DL, 12 MO with 835 chiquis on high speed. Caries removed with round carbide on slow speed. Placed mylar and  Harvey matrix with wedge. Isolation with cotton rolls.     Etch with 37% H2PO4, rinse, dry. Applied Adhese with 20 second scrub once, gentle air dry and light cured for 10s. Restored with Tetric bulk osiel shade A2 and light cured.    Refined with finishing burs, polished with enhance point. Verified occlusion and contacts. Pt left satisfied.    NV: #32 O

## 2025-02-18 NOTE — PROGRESS NOTES
Procedure Details  12 MO  - RESIN-BASED COMPOSITE - 2 SURFACES, POSTERIOR    11 DL  - RESIN-BASED COMPOSITE - 2 SURFACES, ANTERIOR  Composite Filling    Geo Townsend presents for composite filling. PMH reviewed, no changes.    Applied topical benzocaine, administered 1 carp 4% articaine 1:100k epi via buccal infiltration    Prepped tooth #11 DL, 12 MO with 835 chiquis on high speed. Caries removed with round carbide on slow speed. Placed mylar and  Harvey matrix with wedge. Isolation with cotton rolls.     Etch with 37% H2PO4, rinse, dry. Applied Adhese with 20 second scrub once, gentle air dry and light cured for 10s. Restored with Tetric bulk osiel shade A2 and light cured.    Refined with finishing burs, polished with enhance point. Verified occlusion and contacts. Pt left satisfied.    NV: #32 O

## 2025-04-01 ENCOUNTER — OFFICE VISIT (OUTPATIENT)
Dept: DENTISTRY | Facility: CLINIC | Age: 50
End: 2025-04-01

## 2025-04-01 VITALS — DIASTOLIC BLOOD PRESSURE: 96 MMHG | TEMPERATURE: 98.7 F | SYSTOLIC BLOOD PRESSURE: 140 MMHG | HEART RATE: 106 BPM

## 2025-04-01 DIAGNOSIS — K02.9 DENTAL CARIES: Primary | ICD-10-CM

## 2025-04-01 PROCEDURE — D2391 RESIN-BASED COMPOSITE - 1 SURFACE, POSTERIOR: HCPCS

## 2025-04-14 ENCOUNTER — OFFICE VISIT (OUTPATIENT)
Dept: FAMILY MEDICINE CLINIC | Facility: CLINIC | Age: 50
End: 2025-04-14

## 2025-04-14 VITALS
SYSTOLIC BLOOD PRESSURE: 118 MMHG | TEMPERATURE: 98 F | HEART RATE: 91 BPM | HEIGHT: 64 IN | DIASTOLIC BLOOD PRESSURE: 80 MMHG | RESPIRATION RATE: 18 BRPM | BODY MASS INDEX: 40.97 KG/M2 | OXYGEN SATURATION: 96 % | WEIGHT: 240 LBS

## 2025-04-14 DIAGNOSIS — J06.9 VIRAL URI WITH COUGH: Primary | ICD-10-CM

## 2025-04-14 DIAGNOSIS — L30.9 ECZEMA, UNSPECIFIED TYPE: ICD-10-CM

## 2025-04-14 DIAGNOSIS — R00.2 PALPITATIONS: ICD-10-CM

## 2025-04-14 PROCEDURE — 99214 OFFICE O/P EST MOD 30 MIN: CPT | Performed by: FAMILY MEDICINE

## 2025-04-14 RX ORDER — GUAIFENESIN/DEXTROMETHORPHAN 100-10MG/5
5 SYRUP ORAL 3 TIMES DAILY PRN
Qty: 118 ML | Refills: 0 | Status: SHIPPED | OUTPATIENT
Start: 2025-04-14

## 2025-04-14 RX ORDER — BENZONATATE 200 MG/1
200 CAPSULE ORAL 3 TIMES DAILY PRN
Qty: 20 CAPSULE | Refills: 0 | Status: SHIPPED | OUTPATIENT
Start: 2025-04-14

## 2025-04-14 RX ORDER — TRIAMCINOLONE ACETONIDE 1 MG/G
OINTMENT TOPICAL 2 TIMES DAILY
Qty: 30 G | Refills: 1 | Status: SHIPPED | OUTPATIENT
Start: 2025-04-14 | End: 2025-04-28

## 2025-04-14 RX ORDER — METHYLPREDNISOLONE 4 MG/1
TABLET ORAL
Qty: 21 EACH | Refills: 0 | Status: SHIPPED | OUTPATIENT
Start: 2025-04-14

## 2025-04-14 NOTE — PROGRESS NOTES
Name: Geo Townsend      : 1975      MRN: [id number]  Encounter Provider: Brian Lake MD  Encounter Date: 2025   Encounter department: [facility]  :  Assessment & Plan  Viral URI with cough  A few day onset of viral URI symptoms  Recommend supportive care  Orders:    dextromethorphan-guaiFENesin (ROBITUSSIN DM)  mg/5 mL syrup; Take 5 mL by mouth 3 (three) times a day as needed for cough    benzonatate (TESSALON) 200 MG capsule; Take 1 capsule (200 mg total) by mouth 3 (three) times a day as needed for cough    methylPREDNISolone 4 MG tablet therapy pack; Use as directed on package    Eczema, unspecified type  Recommend topical emollient  Recommend topical steroid.  Orders:    triamcinolone (KENALOG) 0.1 % ointment; Apply topically 2 (two) times a day for 14 days    Palpitations  Intermittent palpitations  Suspect related to poor physical condition, obesity and anxiety  Holter monitor  Orders:    Holter monitor; Future          Depression Screening and Follow-up Plan: Patient was screened for depression during today's encounter. They screened negative with a PHQ-9 score of 0.        History of Present Illness    services used for the visit    49-year-old male with a history of obesity who presents today for follow-up.  Patient reports experiencing flulike illness that started 1 week ago.  Symptoms are improving.  He is using over-the-counter medications.  He has been afebrile for 2 days.  He is eating and drinking okay.  He denies any recent sick contact.  He still has some lingering cough with congestion.  He also reports intermittent palpitation for the past couple years.  He reports that his palpitation is usually brought up by increased physical exertion.  He does not exercise much.  He denies any associated syncope.  He does reports intermittent dizziness when he experiences palpitations.      Review of Systems  "  Constitutional:  Negative for chills, diaphoresis, fatigue and fever.   HENT:  Positive for congestion and sore throat. Negative for trouble swallowing.    Eyes:  Negative for visual disturbance.   Respiratory:  Positive for cough. Negative for shortness of breath and wheezing.    Cardiovascular:  Positive for palpitations. Negative for chest pain and leg swelling.   Gastrointestinal:  Negative for abdominal pain, diarrhea, nausea and vomiting.   Genitourinary:  Negative for dysuria, hematuria and urgency.   Musculoskeletal:  Negative for back pain.   Skin:  Negative for rash.   Neurological:  Positive for dizziness. Negative for seizures, syncope, light-headedness and numbness.       Objective   /80 (BP Location: Left arm, Patient Position: Sitting, Cuff Size: Large)   Pulse 91   Temp 98 °F (36.7 °C) (Temporal)   Resp 18   Ht 5' 4\" (1.626 m)   Wt 109 kg (240 lb)   SpO2 96%   BMI 41.20 kg/m²      Physical Exam  Vitals and nursing note reviewed.   Constitutional:       General: He is not in acute distress.     Appearance: Normal appearance. He is well-developed. He is obese. He is not ill-appearing, toxic-appearing or diaphoretic.   HENT:      Head: Normocephalic and atraumatic.      Right Ear: External ear normal.      Left Ear: External ear normal.      Nose: Nose normal.      Mouth/Throat:      Mouth: Mucous membranes are moist.      Pharynx: Posterior oropharyngeal erythema present. No oropharyngeal exudate.   Eyes:      General: No scleral icterus.        Right eye: No discharge.         Left eye: No discharge.      Extraocular Movements: Extraocular movements intact.      Conjunctiva/sclera: Conjunctivae normal.   Cardiovascular:      Rate and Rhythm: Normal rate and regular rhythm.      Heart sounds: Normal heart sounds. No murmur heard.     No friction rub. No gallop.   Pulmonary:      Effort: Pulmonary effort is normal. No respiratory distress.      Breath sounds: Normal breath sounds. No " stridor. No wheezing or rhonchi.   Abdominal:      General: Bowel sounds are normal. There is no distension.      Palpations: Abdomen is soft. There is no mass.      Tenderness: There is no abdominal tenderness.      Hernia: No hernia is present.   Musculoskeletal:      Cervical back: Normal range of motion.      Right lower leg: No edema.      Left lower leg: No edema.   Skin:     General: Skin is warm.      Capillary Refill: Capillary refill takes less than 2 seconds.             Comments: Erythematous, dry scaly rash involving  bilateral lower legs   Neurological:      General: No focal deficit present.      Mental Status: He is alert and oriented to person, place, and time.      Cranial Nerves: No cranial nerve deficit.      Motor: No weakness.      Gait: Gait normal.   Psychiatric:         Mood and Affect: Mood is anxious.

## 2025-04-14 NOTE — ASSESSMENT & PLAN NOTE
Intermittent palpitations  Suspect related to poor physical condition, obesity and anxiety  Holter monitor  Orders:    Holter monitor; Future

## 2025-04-15 NOTE — PROGRESS NOTES
Procedure Details  32 O  - RESIN-BASED COMPOSITE - 1 SURFACE, POSTERIOR  Composite Filling    Geo Townsend presents for composite filling. PMH reviewed, no changes.    Discussed with patient need for RCT if pulp exposure occurs or in future if pulp is inflamed. Pt understands and consents.    Applied topical benzocaine, administered 1 carps 2% lido 1:100k epi via IANB     Prepped tooth #32 with 835 chiquis on high speed. Caries removed with round carbide on slow speed.  Isolation with cotton rolls and dri-angles    Etch with 37% H2PO4, rinse, dry. Applied Adhese with 20 second scrub once, gentle air dry and light cured for 10s. Restored with Tetric bulk osiel shade A2 and light cured.    Refined with finishing burs, polished with enhance point. Verified occlusion and contacts. Pt left satisfied.    NV:Maru, 60 min, #3 crown prep if interested     recall

## 2025-04-15 NOTE — DENTAL PROCEDURE DETAILS
Composite Filling    Geo Townsend presents for composite filling. PMH reviewed, no changes.    Discussed with patient need for RCT if pulp exposure occurs or in future if pulp is inflamed. Pt understands and consents.    Applied topical benzocaine, administered 1 carps 2% lido 1:100k epi via IANB     Prepped tooth #32 with 835 chiquis on high speed. Caries removed with round carbide on slow speed.  Isolation with cotton rolls and dri-angles    Etch with 37% H2PO4, rinse, dry. Applied Adhese with 20 second scrub once, gentle air dry and light cured for 10s. Restored with Tetric bulk osiel shade A2 and light cured.    Refined with finishing burs, polished with enhance point. Verified occlusion and contacts. Pt left satisfied.    NV:Maru, 60 min, #3 crown prep if interested     recall

## 2025-04-22 ENCOUNTER — OFFICE VISIT (OUTPATIENT)
Dept: DENTISTRY | Facility: CLINIC | Age: 50
End: 2025-04-22

## 2025-04-22 VITALS — SYSTOLIC BLOOD PRESSURE: 141 MMHG | HEART RATE: 112 BPM | TEMPERATURE: 96 F | DIASTOLIC BLOOD PRESSURE: 97 MMHG

## 2025-04-22 DIAGNOSIS — K02.9 DENTAL CARIES: Primary | ICD-10-CM

## 2025-04-22 PROCEDURE — D2391 RESIN-BASED COMPOSITE - 1 SURFACE, POSTERIOR: HCPCS

## 2025-04-22 PROCEDURE — D0140 LIMITED ORAL EVALUATION - PROBLEM FOCUSED: HCPCS

## 2025-05-05 NOTE — DENTAL PROCEDURE DETAILS
Dental procedures in this visit     - RESIN-BASED COMPOSITE - 1 SURFACE, POSTERIOR 2 O (Completed)     Service provider: Puma Mahoney DMD     Billing provider: Puma Mahoney DMD     - LIMITED ORAL EVALUATION - PROBLEM FOCUSED (Completed)     Service provider: Puma Mahoney DMD     Billing provider: Puma Mahoney DMD     Subjective   Patient ID: Geo Townsend is a 49 y.o. male.  No chief complaint on file.    HPI  The following portions of the chart were reviewed this encounter and updated as appropriate:    Tobacco  Allergies  Meds  Problems  Med Hx  Surg Hx  Fam Hx           Objective   Soft Tissue Exam  No findings documented this visit      Dental Exam    Radiographic Interpretation:   Associated radiographs for today's visit were reviewed and finding(s) were discussed with the patient.   Findings include: BW #2 Occ radiolucency distal to restoration  Hard Tissue Exam:  #2 sensitive to cold    Assessment & Plan   Problem List Items Addressed This Visit    None  Visit Diagnoses         Dental caries    -  Primary    Relevant Orders    2 O RESIN-BASED COMPOSITE - 1 SURFACE, POSTERIOR (Completed)    LIMITED ORAL EVALUATION - PROBLEM FOCUSED (Completed)        50 yo male presents with #2 cold sensitivity, the decision was made to replace the restoration today to see if symptoms resolve.    Composite Filling    Geo Townsend presents for composite filling. PMH reviewed, no changes.    Discussed with patient need for RCT if pulp exposure occurs or in future if pulp is inflamed. Pt understands and consents.    Applied topical benzocaine, administered 1 carps 4% articaine 1:100k epi via buccal infiltration     Prepped tooth #2 O with 835 chiquis on high speed. Caries removed with round carbide on slow speed. Isolation with cotton rolls and dri-angles    Lime Lite places on deepest part of prep. Etch with 37% H2PO4, rinse, dry. Applied Adhese with 20 second scrub once,  gentle air dry and light cured for 10s. Restored with Tetric bulk osiel shade A2 and light cured.    Refined with finishing burs, polished with enhance point. Verified occlusion and contacts. Pt left satisfied.    NV #3 crown prep

## 2025-05-05 NOTE — PROGRESS NOTES
Procedure Details  2 O  - RESIN-BASED COMPOSITE - 1 SURFACE, POSTERIOR     - LIMITED ORAL EVALUATION - PROBLEM FOCUSED  Dental procedures in this visit     - RESIN-BASED COMPOSITE - 1 SURFACE, POSTERIOR 2 O (Completed)     Service provider: Puma Mahoney DMD     Billing provider: Puma Mahoney DMD     - LIMITED ORAL EVALUATION - PROBLEM FOCUSED (Completed)     Service provider: Puma Mahoney DMD     Billing provider: Puma Mahoney DMD     Subjective   Patient ID: Geo Townsend is a 49 y.o. male.  No chief complaint on file.    HPI  The following portions of the chart were reviewed this encounter and updated as appropriate:    Tobacco  Allergies  Meds  Problems  Med Hx  Surg Hx  Fam Hx           Objective   Soft Tissue Exam  No findings documented this visit      Dental Exam    Radiographic Interpretation:   Associated radiographs for today's visit were reviewed and finding(s) were discussed with the patient.   Findings include:  BW #2 Occ radiolucency distal to restoration  Hard Tissue Exam:  #2 sensitive to cold    Assessment & Plan   Problem List Items Addressed This Visit    None  Visit Diagnoses         Dental caries    -  Primary    Relevant Orders    2 O RESIN-BASED COMPOSITE - 1 SURFACE, POSTERIOR (Completed)    LIMITED ORAL EVALUATION - PROBLEM FOCUSED (Completed)        48 yo male presents with #2 cold sensitivity, the decision was made to replace the restoration today to see if symptoms resolve.    Composite Filling    Geo Townsend presents for composite filling. PMH reviewed, no changes.    Discussed with patient need for RCT if pulp exposure occurs or in future if pulp is inflamed. Pt understands and consents.    Applied topical benzocaine, administered 1 carps 4% articaine 1:100k epi via buccal infiltration     Prepped tooth #2 O with 835 chiquis on high speed. Caries removed with round carbide on slow speed. Isolation with cotton rolls and  dri-angles    Lime Lite places on deepest part of prep. Etch with 37% H2PO4, rinse, dry. Applied Adhese with 20 second scrub once, gentle air dry and light cured for 10s. Restored with Tetric bulk osiel shade A2 and light cured.    Refined with finishing burs, polished with enhance point. Verified occlusion and contacts. Pt left satisfied.    NV #3 crown prep

## 2025-07-30 ENCOUNTER — OFFICE VISIT (OUTPATIENT)
Dept: DENTISTRY | Facility: CLINIC | Age: 50
End: 2025-07-30

## 2025-07-30 VITALS — HEART RATE: 80 BPM | SYSTOLIC BLOOD PRESSURE: 110 MMHG | DIASTOLIC BLOOD PRESSURE: 71 MMHG

## 2025-07-30 DIAGNOSIS — Z01.20 ENCOUNTER FOR DENTAL EXAMINATION: Primary | ICD-10-CM

## 2025-07-30 PROCEDURE — D0120 PERIODIC ORAL EVALUATION - ESTABLISHED PATIENT: HCPCS

## 2025-07-30 PROCEDURE — D1110 PROPHYLAXIS - ADULT: HCPCS | Performed by: DENTAL HYGIENIST

## 2025-07-30 PROCEDURE — D1330 ORAL HYGIENE INSTRUCTIONS: HCPCS | Performed by: DENTAL HYGIENIST
